# Patient Record
Sex: FEMALE | URBAN - METROPOLITAN AREA
[De-identification: names, ages, dates, MRNs, and addresses within clinical notes are randomized per-mention and may not be internally consistent; named-entity substitution may affect disease eponyms.]

---

## 2022-11-21 ENCOUNTER — APPOINTMENT (OUTPATIENT)
Dept: URBAN - METROPOLITAN AREA CLINIC 230 | Age: 77
Setting detail: DERMATOLOGY
End: 2022-11-21

## 2022-11-21 DIAGNOSIS — D22 MELANOCYTIC NEVI: ICD-10-CM

## 2022-11-21 PROBLEM — D48.5 NEOPLASM OF UNCERTAIN BEHAVIOR OF SKIN: Status: ACTIVE | Noted: 2022-11-21

## 2022-11-21 PROCEDURE — 11305 SHAVE SKIN LESION 0.5 CM/<: CPT

## 2022-11-21 PROCEDURE — OTHER SHAVE REMOVAL: OTHER

## 2022-11-21 ASSESSMENT — LOCATION ZONE DERM: LOCATION ZONE: FEET

## 2022-11-21 ASSESSMENT — LOCATION SIMPLE DESCRIPTION DERM: LOCATION SIMPLE: LEFT FOOT

## 2022-11-21 ASSESSMENT — LOCATION DETAILED DESCRIPTION DERM: LOCATION DETAILED: LEFT MEDIAL DORSAL FOOT

## 2022-11-21 NOTE — PROCEDURE: SHAVE REMOVAL
Path Notes (To The Dermatopathologist): please check margins
Anesthesia Type: 1% lidocaine with epinephrine
Bill For Surgical Tray: no
Hemostasis: Drysol
Notification Instructions: Patient will be notified of pathology results. However, patient instructed to call the office if not contacted within 2 weeks.
Medical Necessity Clause: This procedure was medically necessary because the lesion that was treated was:
Billing Type: Third-Party Bill
Was A Bandage Applied: Yes
Size Of Lesion In Cm (Required): 0.3
Medical Necessity Information: It is in your best interest to select a reason for this procedure from the list below. All of these items fulfill various CMS LCD requirements except the new and changing color options.
Biopsy Method: 15 blade
Detail Level: Detailed
X Size Of Lesion In Cm (Optional): 0
Consent was obtained from the patient. The risks and benefits to therapy were discussed in detail. Specifically, the risks of infection, scarring, bleeding, prolonged wound healing, incomplete removal, allergy to anesthesia, nerve injury and recurrence were addressed. Prior to the procedure, the treatment site was clearly identified and confirmed by the patient. All components of Universal Protocol/PAUSE Rule completed.
Post-Care Instructions: I reviewed with the patient in detail post-care instructions. Patient is to keep the biopsy site dry overnight, and then apply bacitracin twice daily until healed. Patient may apply hydrogen peroxide soaks to remove any crusting.
Wound Care: Petrolatum